# Patient Record
Sex: FEMALE | Race: BLACK OR AFRICAN AMERICAN
[De-identification: names, ages, dates, MRNs, and addresses within clinical notes are randomized per-mention and may not be internally consistent; named-entity substitution may affect disease eponyms.]

---

## 2017-09-22 ENCOUNTER — HOSPITAL ENCOUNTER (EMERGENCY)
Dept: HOSPITAL 53 - M ED | Age: 27
LOS: 1 days | Discharge: HOME | End: 2017-09-23
Payer: OTHER GOVERNMENT

## 2017-09-22 VITALS — BODY MASS INDEX: 26.52 KG/M2 | WEIGHT: 155.32 LBS | HEIGHT: 64 IN

## 2017-09-22 DIAGNOSIS — O20.8: Primary | ICD-10-CM

## 2017-09-22 DIAGNOSIS — Z3A.01: ICD-10-CM

## 2017-09-22 LAB
BASOPHILS # BLD AUTO: 0 K/MM3 (ref 0–0.2)
BASOPHILS NFR BLD AUTO: 0.3 % (ref 0–1)
EOSINOPHIL # BLD AUTO: 0.2 K/MM3 (ref 0–0.5)
EOSINOPHIL NFR BLD AUTO: 2.7 % (ref 0–3)
ERYTHROCYTE [DISTWIDTH] IN BLOOD BY AUTOMATED COUNT: 12.8 % (ref 11.5–14.5)
LARGE UNSTAINED CELL #: 0.2 K/MM3 (ref 0–0.4)
LARGE UNSTAINED CELL %: 1.8 % (ref 0–4)
LYMPHOCYTES # BLD AUTO: 2 K/MM3 (ref 1.5–6.5)
LYMPHOCYTES NFR BLD AUTO: 23 % (ref 24–44)
MCH RBC QN AUTO: 25.5 PG (ref 27–33)
MCHC RBC AUTO-ENTMCNC: 34.5 G/DL (ref 32–36.5)
MCV RBC AUTO: 73.9 FL (ref 80–96)
MONOCYTES # BLD AUTO: 0.5 K/MM3 (ref 0–0.8)
MONOCYTES NFR BLD AUTO: 5.9 % (ref 0–5)
NEUTROPHILS # BLD AUTO: 5.8 K/MM3 (ref 1.8–7.7)
NEUTROPHILS NFR BLD AUTO: 66.3 % (ref 36–66)
PLATELET # BLD AUTO: 284 K/MM3 (ref 150–450)
WBC # BLD AUTO: 8.7 K/MM3 (ref 4–10)

## 2017-09-22 NOTE — REPUSA
Clinical history: vaginal spotting.

Findings: Real-time transabdominal ultrasound images of the pelvis were obtained. There is a single l
marciano intrauterine gestation. The crown rump length measures 0.8 cm. Fetal heart rate measures 122 bpm.
 There is a small hypoechoic area adjacent to the gestational sac measuring 1.0 x 0.8 x 3.9 cm. An an
teverted uterus is noted, measuring 7.8 x 4.9 x 6.3 8 cm. The uterus demonstrates normal echotexture 
and echogenicity. The right ovary measures 3.6 x 2.6 x 1.9 cm. The left ovary measures 1.5 x 2.5 x 1.
2 cm. No adnexal masses are seen. There is no evidence of free fluid.

Impression:

1. Single live intrauterine pregnancy measuring 6 weeks 5 days by ultrasound measurements, with a fet
al heart rate of 122 bpm.

2. Small subchorionic hemorrhage.

     Electronically signed by CORIN SNELL MD on 09/22/2017 11:24:03 PM ET

## 2017-09-23 VITALS — SYSTOLIC BLOOD PRESSURE: 127 MMHG | DIASTOLIC BLOOD PRESSURE: 74 MMHG

## 2018-01-12 ENCOUNTER — HOSPITAL ENCOUNTER (OUTPATIENT)
Dept: HOSPITAL 53 - M LDO | Age: 28
Discharge: HOME | End: 2018-01-12
Attending: OBSTETRICS & GYNECOLOGY
Payer: COMMERCIAL

## 2018-01-12 DIAGNOSIS — Z3A.23: ICD-10-CM

## 2018-01-12 DIAGNOSIS — O26.852: Primary | ICD-10-CM

## 2018-01-12 DIAGNOSIS — O20.8: ICD-10-CM

## 2018-01-12 DIAGNOSIS — O26.892: ICD-10-CM

## 2018-01-12 DIAGNOSIS — N93.9: ICD-10-CM

## 2018-01-12 PROCEDURE — 76811 OB US DETAILED SNGL FETUS: CPT

## 2018-05-17 ENCOUNTER — HOSPITAL ENCOUNTER (INPATIENT)
Dept: HOSPITAL 53 - M LDI | Age: 28
LOS: 3 days | Discharge: HOME | End: 2018-05-20
Attending: OBSTETRICS & GYNECOLOGY | Admitting: OBSTETRICS & GYNECOLOGY
Payer: COMMERCIAL

## 2018-05-17 DIAGNOSIS — O48.0: Primary | ICD-10-CM

## 2018-05-17 DIAGNOSIS — Z3A.41: ICD-10-CM

## 2018-05-17 DIAGNOSIS — D57.3: ICD-10-CM

## 2018-05-17 LAB
HEMATOCRIT: 35.8 % (ref 36–47)
HEMOGLOBIN: 11.7 G/DL (ref 12–15.5)
MEAN CORPUSCULAR HEMOGLOBIN: 23.7 PG (ref 27–33)
MEAN CORPUSCULAR HGB CONC: 32.7 G/DL (ref 32–36.5)
MEAN CORPUSCULAR VOLUME: 72.6 FL (ref 80–96)
NRBC BLD AUTO-RTO: 0 % (ref 0–0)
PLATELET COUNT, AUTOMATED: 334 10^3/UL (ref 150–450)
RED BLOOD COUNT: 4.93 10^6/UL (ref 4–5.4)
RED CELL DISTRIBUTION WIDTH: 14.5 % (ref 11.5–14.5)
WHITE BLOOD COUNT: 10.4 10^3/UL (ref 4–10)

## 2018-05-17 RX ADMIN — SODIUM CHLORIDE, POTASSIUM CHLORIDE, SODIUM LACTATE AND CALCIUM CHLORIDE 1 MLS/HR: 600; 310; 30; 20 INJECTION, SOLUTION INTRAVENOUS at 15:56

## 2018-05-17 RX ADMIN — MISOPROSTOL 1 MCG: 100 TABLET ORAL at 19:28

## 2018-05-17 RX ADMIN — SODIUM CHLORIDE, POTASSIUM CHLORIDE, SODIUM LACTATE AND CALCIUM CHLORIDE 1 MLS/HR: 600; 310; 30; 20 INJECTION, SOLUTION INTRAVENOUS at 18:56

## 2018-05-17 RX ADMIN — MISOPROSTOL 1 MCG: 100 TABLET ORAL at 23:43

## 2018-05-17 RX ADMIN — MISOPROSTOL 1 MCG: 100 TABLET ORAL at 15:23

## 2018-05-17 RX ADMIN — SODIUM CHLORIDE, POTASSIUM CHLORIDE, SODIUM LACTATE AND CALCIUM CHLORIDE 1 MLS/HR: 600; 310; 30; 20 INJECTION, SOLUTION INTRAVENOUS at 14:54

## 2018-05-18 LAB
CORD GAS ABE A: -10.3
CORD GAS ABE V: -8.8
CORD GAS HCO3 A: 19.7 MEQ/L
CORD GAS HCO3 V: 21.6 MEQ/L
CORD GAS O2 SAT A: 59.8 %
CORD GAS O2 SAT V: 45.9 %
CORD GAS PCO2 A: 62 MMHG
CORD GAS PCO2 V: 66 MMHG
CORD GAS PH A: 7.12 UNITS
CORD GAS PH V: 7.13 UNITS
CORD GAS PO2 A: 30 MMHG
CORD GAS PO2 V: 25.2 MMHG
CORD GAS SBC A: 15.6 MEQ/L
CORD GAS SBC V: 16.4 MEQ/L
CORD GAS TCO2 A: 21.7 MEQ/L
CORD GAS TCO2 V: 23.6 MEQ/L
SYPHILIS: NONREACTIVE

## 2018-05-18 PROCEDURE — 3E0P7VZ INTRODUCTION OF HORMONE INTO FEMALE REPRODUCTIVE, VIA NATURAL OR ARTIFICIAL OPENING: ICD-10-PCS

## 2018-05-18 RX ADMIN — SODIUM CHLORIDE, POTASSIUM CHLORIDE, SODIUM LACTATE AND CALCIUM CHLORIDE 1 MLS/HR: 600; 310; 30; 20 INJECTION, SOLUTION INTRAVENOUS at 08:49

## 2018-05-18 RX ADMIN — Medication 1 MLS/HR: at 08:49

## 2018-05-18 RX ADMIN — BUTORPHANOL TARTRATE 1 MG: 2 INJECTION, SOLUTION INTRAMUSCULAR; INTRAVENOUS at 08:49

## 2018-05-18 RX ADMIN — SODIUM CHLORIDE, POTASSIUM CHLORIDE, SODIUM LACTATE AND CALCIUM CHLORIDE 1 MLS/HR: 600; 310; 30; 20 INJECTION, SOLUTION INTRAVENOUS at 13:55

## 2018-05-18 RX ADMIN — TERBUTALINE SULFATE 1 MG: 1 INJECTION SUBCUTANEOUS at 22:30

## 2018-05-18 RX ADMIN — BUTORPHANOL TARTRATE 1 MG: 2 INJECTION, SOLUTION INTRAMUSCULAR; INTRAVENOUS at 13:55

## 2018-05-18 RX ADMIN — SODIUM CITRATE AND CITRIC ACID MONOHYDRATE 1 ML: 500; 334 SOLUTION ORAL at 22:45

## 2018-05-19 RX ADMIN — FENTANYL CITRATE 1 MCG: 50 INJECTION, SOLUTION INTRAMUSCULAR; INTRAVENOUS at 01:41

## 2018-05-19 RX ADMIN — MORPHINE SULFATE 1 MG: 4 INJECTION INTRAVENOUS at 02:15

## 2018-05-19 RX ADMIN — DOCUSATE SODIUM 1 MG: 100 CAPSULE, LIQUID FILLED ORAL at 21:34

## 2018-05-19 RX ADMIN — DOCUSATE SODIUM 1 MG: 100 CAPSULE, LIQUID FILLED ORAL at 08:47

## 2018-05-19 RX ADMIN — SODIUM CHLORIDE, POTASSIUM CHLORIDE, SODIUM LACTATE AND CALCIUM CHLORIDE 1 MLS/HR: 600; 310; 30; 20 INJECTION, SOLUTION INTRAVENOUS at 00:22

## 2018-05-19 RX ADMIN — KETOROLAC TROMETHAMINE 1 MG: 30 INJECTION, SOLUTION INTRAMUSCULAR at 12:10

## 2018-05-19 RX ADMIN — KETOROLAC TROMETHAMINE 1 MG: 30 INJECTION, SOLUTION INTRAMUSCULAR at 06:37

## 2018-05-19 RX ADMIN — SODIUM CHLORIDE, POTASSIUM CHLORIDE, SODIUM LACTATE AND CALCIUM CHLORIDE 1 MLS/HR: 600; 310; 30; 20 INJECTION, SOLUTION INTRAVENOUS at 00:17

## 2018-05-19 RX ADMIN — FENTANYL CITRATE 1 MCG: 50 INJECTION, SOLUTION INTRAMUSCULAR; INTRAVENOUS at 00:25

## 2018-05-19 RX ADMIN — FENTANYL CITRATE 1 MCG: 50 INJECTION, SOLUTION INTRAMUSCULAR; INTRAVENOUS at 00:33

## 2018-05-19 RX ADMIN — Medication 1 TAB: at 08:47

## 2018-05-19 RX ADMIN — KETOROLAC TROMETHAMINE 1 MG: 30 INJECTION, SOLUTION INTRAMUSCULAR at 18:39

## 2018-05-19 RX ADMIN — SODIUM CHLORIDE, POTASSIUM CHLORIDE, SODIUM LACTATE AND CALCIUM CHLORIDE 1 MLS/HR: 600; 310; 30; 20 INJECTION, SOLUTION INTRAVENOUS at 04:06

## 2018-05-20 LAB
HEMATOCRIT: 25.3 % (ref 36–47)
HEMOGLOBIN: 8.3 G/DL (ref 12–15.5)
MEAN CORPUSCULAR HEMOGLOBIN: 24.1 PG (ref 27–33)
MEAN CORPUSCULAR HGB CONC: 32.8 G/DL (ref 32–36.5)
MEAN CORPUSCULAR VOLUME: 73.3 FL (ref 80–96)
NRBC BLD AUTO-RTO: 0 % (ref 0–0)
PLATELET COUNT, AUTOMATED: 232 10^3/UL (ref 150–450)
RED BLOOD COUNT: 3.45 10^6/UL (ref 4–5.4)
RED CELL DISTRIBUTION WIDTH: 14.6 % (ref 11.5–14.5)
WHITE BLOOD COUNT: 14.6 10^3/UL (ref 4–10)

## 2018-05-20 RX ADMIN — Medication 1 TAB: at 09:19

## 2018-05-20 RX ADMIN — KETOROLAC TROMETHAMINE 1 MG: 30 INJECTION, SOLUTION INTRAMUSCULAR at 00:46

## 2018-05-20 RX ADMIN — DOCUSATE SODIUM 1 MG: 100 CAPSULE, LIQUID FILLED ORAL at 09:19

## 2018-05-20 RX ADMIN — KETOROLAC TROMETHAMINE 1 MG: 30 INJECTION, SOLUTION INTRAMUSCULAR at 00:30

## 2018-05-20 RX ADMIN — IBUPROFEN 1 MG: 800 TABLET, FILM COATED ORAL at 02:27

## 2018-05-20 RX ADMIN — IBUPROFEN 1 MG: 800 TABLET, FILM COATED ORAL at 11:05
